# Patient Record
Sex: MALE | Employment: STUDENT | URBAN - METROPOLITAN AREA
[De-identification: names, ages, dates, MRNs, and addresses within clinical notes are randomized per-mention and may not be internally consistent; named-entity substitution may affect disease eponyms.]

---

## 2017-09-24 ENCOUNTER — OFFICE VISIT (OUTPATIENT)
Dept: FAMILY MEDICINE CLINIC | Facility: CLINIC | Age: 22
End: 2017-09-24

## 2017-09-24 VITALS
TEMPERATURE: 99 F | OXYGEN SATURATION: 98 % | WEIGHT: 177 LBS | SYSTOLIC BLOOD PRESSURE: 122 MMHG | HEART RATE: 77 BPM | HEIGHT: 74 IN | DIASTOLIC BLOOD PRESSURE: 70 MMHG | BODY MASS INDEX: 22.72 KG/M2

## 2017-09-24 DIAGNOSIS — H66.003 ACUTE SUPPURATIVE OTITIS MEDIA OF BOTH EARS WITHOUT SPONTANEOUS RUPTURE OF TYMPANIC MEMBRANES, RECURRENCE NOT SPECIFIED: ICD-10-CM

## 2017-09-24 DIAGNOSIS — J01.00 ACUTE NON-RECURRENT MAXILLARY SINUSITIS: Primary | ICD-10-CM

## 2017-09-24 PROCEDURE — 99202 OFFICE O/P NEW SF 15 MIN: CPT | Performed by: NURSE PRACTITIONER

## 2017-09-24 RX ORDER — PREDNISONE 20 MG/1
20 TABLET ORAL DAILY
Qty: 5 TABLET | Refills: 0 | Status: SHIPPED | OUTPATIENT
Start: 2017-09-24 | End: 2017-09-29

## 2017-09-24 RX ORDER — CEFDINIR 300 MG/1
300 CAPSULE ORAL 2 TIMES DAILY
Qty: 20 CAPSULE | Refills: 0 | Status: SHIPPED | OUTPATIENT
Start: 2017-09-24 | End: 2017-10-04

## 2017-09-24 NOTE — PROGRESS NOTES
CHIEF COMPLAINT:   Patient presents with:  Sinus Problem: nose congestion, drainage, ear discomfort, sore throat, cough with phlegm, sinus pressure and headaches x 1 mon took Amox for 10 dys but still has sx       HPI:   Deja Estrella is a 20year old m EXAM:   /70   Pulse 77   Temp 98.8 °F (37.1 °C) (Oral)   Ht 74\"   Wt 177 lb   SpO2 98%   BMI 22.73 kg/m²   GENERAL: well developed, well nourished,in no apparent distress  SKIN: no rashes,no suspicious lesions  HEAD: atraumatic, normocephalic, +  te Sig: Take 1 capsule (300 mg total) by mouth 2 (two) times daily. Risks, benefits, side effects of medication addressed and explained.     Patient Instructions     Sinusitis (Antibiotic Treatment)    The sinuses are air-filled spaces within the · Over-the-counter antihistamines may help if allergies contributed to your sinusitis.    · Do not use nasal rinses or irrigation during an acute sinus infection, unless told to by your health care provider.  Rinsing may spread the infection to other sinuse

## 2017-12-20 ENCOUNTER — HOSPITAL ENCOUNTER (OUTPATIENT)
Dept: MAMMOGRAPHY | Facility: HOSPITAL | Age: 22
Discharge: HOME | End: 2017-12-20
Attending: INTERNAL MEDICINE
Payer: COMMERCIAL

## 2017-12-20 DIAGNOSIS — N63.0 BREAST LUMP: ICD-10-CM

## 2017-12-20 PROCEDURE — 76642 ULTRASOUND BREAST LIMITED: CPT | Performed by: INTERNAL MEDICINE

## 2017-12-20 NOTE — PROGRESS NOTES
U/S demonstrates some focal gynecomastia. No worrisome findings. Likely normal variant, but check TSH, HCG, DHEA, testosterone (total and free), LH, FSH.   Labs should be done first thing in AM.

## 2018-11-21 ENCOUNTER — HOSPITAL ENCOUNTER (OUTPATIENT)
Age: 23
Discharge: HOME | End: 2018-11-21
Attending: FAMILY MEDICINE
Payer: COMMERCIAL

## 2018-11-21 VITALS
TEMPERATURE: 98 F | HEIGHT: 74 IN | BODY MASS INDEX: 23.74 KG/M2 | RESPIRATION RATE: 16 BRPM | OXYGEN SATURATION: 98 % | SYSTOLIC BLOOD PRESSURE: 128 MMHG | WEIGHT: 185 LBS | DIASTOLIC BLOOD PRESSURE: 74 MMHG | HEART RATE: 68 BPM

## 2018-11-21 DIAGNOSIS — R10.9 ABDOMINAL PAIN, ACUTE: ICD-10-CM

## 2018-11-21 DIAGNOSIS — I88.9 CERVICAL LYMPHADENITIS: Primary | ICD-10-CM

## 2018-11-21 PROCEDURE — 85025 COMPLETE CBC W/AUTO DIFF WBC: CPT | Performed by: FAMILY MEDICINE

## 2018-11-21 PROCEDURE — 80047 BASIC METABLC PNL IONIZED CA: CPT

## 2018-11-21 PROCEDURE — 36415 COLL VENOUS BLD VENIPUNCTURE: CPT

## 2018-11-21 PROCEDURE — 99213 OFFICE O/P EST LOW 20 MIN: CPT

## 2018-11-21 PROCEDURE — 80053 COMPREHEN METABOLIC PANEL: CPT | Performed by: FAMILY MEDICINE

## 2018-11-21 PROCEDURE — 99204 OFFICE O/P NEW MOD 45 MIN: CPT

## 2018-11-21 RX ORDER — AMOXICILLIN AND CLAVULANATE POTASSIUM 875; 125 MG/1; MG/1
1 TABLET, FILM COATED ORAL 2 TIMES DAILY
Qty: 20 TABLET | Refills: 0 | Status: SHIPPED | OUTPATIENT
Start: 2018-11-21 | End: 2018-12-01

## 2018-11-21 NOTE — ED INITIAL ASSESSMENT (HPI)
Had stomach flu approx. 10 days ago w  Fever, body ache v/d x 48 hours. Has never felt normal since. Soft stool today, denies n/v. Now having constant Dull pain to entire abd. .  X 72 hours, Left side of neck has swollen lymph node.   Denies sore throat o

## 2018-11-22 NOTE — ED PROVIDER NOTES
Patient Seen in: [address] [facility]    History   Patient presents with:  Abdomen/Flank Pain (GI/)    Stated Complaint: abdominal pain;fatigue;headache x3 days    HPI    42-year-old male presents with the complaint of left-sided n Current:/74   Pulse 68   Temp 97.7 °F (36.5 °C) (Temporal)   Resp 16   Ht 188 cm (6' 2\")   Wt 83.9 kg   SpO2 98%   BMI 23.75 kg/m²         Physical Exam          OBJECTIVE: GENERAL APPEARANCE: well developed, alert Oriented x 3 in no acute distress, Acute abdominal pain for 5 days. No vomiting, no diarrhea, unremarkable labs. Monitor symptoms and follow-up PCP. If has any worsening symptoms, new symptoms recommend follow-up earlier or go to the ER.   Patient verbalized understanding agrees with the

## 2018-11-23 ENCOUNTER — APPOINTMENT (OUTPATIENT)
Dept: CT IMAGING | Facility: HOSPITAL | Age: 23
End: 2018-11-23
Attending: FAMILY MEDICINE
Payer: COMMERCIAL

## 2018-11-23 ENCOUNTER — HOSPITAL ENCOUNTER (OUTPATIENT)
Age: 23
Discharge: HOME | End: 2018-11-23
Attending: FAMILY MEDICINE
Payer: COMMERCIAL

## 2018-11-23 VITALS
SYSTOLIC BLOOD PRESSURE: 131 MMHG | HEIGHT: 74 IN | WEIGHT: 185 LBS | HEART RATE: 65 BPM | BODY MASS INDEX: 23.74 KG/M2 | DIASTOLIC BLOOD PRESSURE: 76 MMHG | RESPIRATION RATE: 18 BRPM | TEMPERATURE: 98 F | OXYGEN SATURATION: 99 %

## 2018-11-23 DIAGNOSIS — J02.9 ACUTE VIRAL PHARYNGITIS: ICD-10-CM

## 2018-11-23 DIAGNOSIS — R59.0 LYMPHADENOPATHY OF HEAD AND NECK REGION: Primary | ICD-10-CM

## 2018-11-23 DIAGNOSIS — K59.00 CONSTIPATION, UNSPECIFIED CONSTIPATION TYPE: ICD-10-CM

## 2018-11-23 PROCEDURE — 99214 OFFICE O/P EST MOD 30 MIN: CPT

## 2018-11-23 PROCEDURE — 85025 COMPLETE CBC W/AUTO DIFF WBC: CPT | Performed by: FAMILY MEDICINE

## 2018-11-23 PROCEDURE — 86308 HETEROPHILE ANTIBODY SCREEN: CPT | Performed by: FAMILY MEDICINE

## 2018-11-23 PROCEDURE — 80047 BASIC METABLC PNL IONIZED CA: CPT

## 2018-11-23 PROCEDURE — 70491 CT SOFT TISSUE NECK W/DYE: CPT | Performed by: FAMILY MEDICINE

## 2018-11-23 PROCEDURE — 36415 COLL VENOUS BLD VENIPUNCTURE: CPT

## 2018-11-23 PROCEDURE — 74177 CT ABD & PELVIS W/CONTRAST: CPT | Performed by: FAMILY MEDICINE

## 2018-11-23 RX ORDER — MOMETASONE 50 UG/1
SPRAY, METERED NASAL DAILY
COMMUNITY

## 2018-11-23 RX ORDER — PREDNISONE 20 MG/1
20 TABLET ORAL DAILY
Qty: 3 TABLET | Refills: 0 | Status: SHIPPED | OUTPATIENT
Start: 2018-11-23 | End: 2018-11-26

## 2018-11-23 NOTE — ED INITIAL ASSESSMENT (HPI)
Was here on 11/21/18 with swollen neck/abdominal pain. Here today stating the abdominal pain/bloating and swelling to the left side of the neck is still present and unchanged.   Then yesterday while at a [place] in [place], patient developed right eye swelling/

## 2018-11-24 NOTE — ED PROVIDER NOTES
Patient Seen in: [address] [facility]    History   Patient presents with: Follow - Up    Stated Complaint: follow up    HPI    14-year-old male returns to the ICU secondary to his symptoms not improving.   Patient is having a bowel d complaint: follow up  Other systems are as noted in HPI. Constitutional and vital signs reviewed. All other systems reviewed and negative except as noted above.     Physical Exam     ED Triage Vitals [11/23/18 1526]   /81   Pulse 69   Resp 18 throughout the hypopharynx.  Again this is most likely due to the patient's swallowing or motion given that the patient is not stridorous or significantly short of breath according to the position.  The   piriform sinuses are small in size but are symmetri followup with ultrasound could be performed.     Findings were discussed with Dr. Margot Yo at [facility] on 11/23/2018.        FINDINGS:    LIVER:  No enlargement, atrophy, abnormal density, or significant focal lesion.    BILIARY:  No visible dilatation or calc will travel to the hospital for the imaging.              Disposition and Plan     Clinical Impression:  Lymphadenopathy of head and neck region  (primary encounter diagnosis)  Constipation, unspecified constipation type  Acute viral pharyngitis    Disposit

## (undated) NOTE — LETTER
December 28, 2017          [address] [facility]          Dear Taye Kunz:    Breast ultrasound demonstrates some focal gynecomastia.  No worrisome findings.  Likely normal variant, but check TSH, HCG, DHEA, testosterone (total and